# Patient Record
Sex: FEMALE | Race: WHITE | NOT HISPANIC OR LATINO | Employment: STUDENT | URBAN - METROPOLITAN AREA
[De-identification: names, ages, dates, MRNs, and addresses within clinical notes are randomized per-mention and may not be internally consistent; named-entity substitution may affect disease eponyms.]

---

## 2017-04-25 ENCOUNTER — ALLSCRIPTS OFFICE VISIT (OUTPATIENT)
Dept: OTHER | Facility: OTHER | Age: 22
End: 2017-04-25

## 2017-05-08 ENCOUNTER — ALLSCRIPTS OFFICE VISIT (OUTPATIENT)
Dept: OTHER | Facility: OTHER | Age: 22
End: 2017-05-08

## 2017-05-08 LAB
BILIRUB UR QL STRIP: NORMAL
CLARITY UR: NORMAL
COLOR UR: NORMAL
GLUCOSE (HISTORICAL): NORMAL
HGB UR QL STRIP.AUTO: NORMAL
KETONES UR STRIP-MCNC: NORMAL MG/DL
LEUKOCYTE ESTERASE UR QL STRIP: NORMAL
NITRITE UR QL STRIP: NORMAL
PH UR STRIP.AUTO: 6 [PH]
PROT UR STRIP-MCNC: NORMAL MG/DL
SP GR UR STRIP.AUTO: 1
UROBILINOGEN UR QL STRIP.AUTO: NORMAL

## 2017-07-18 ENCOUNTER — LAB REQUISITION (OUTPATIENT)
Dept: LAB | Facility: HOSPITAL | Age: 22
End: 2017-07-18
Payer: COMMERCIAL

## 2017-07-18 ENCOUNTER — ALLSCRIPTS OFFICE VISIT (OUTPATIENT)
Dept: OTHER | Facility: OTHER | Age: 22
End: 2017-07-18

## 2017-07-18 DIAGNOSIS — Z01.419 ENCOUNTER FOR GYNECOLOGICAL EXAMINATION WITHOUT ABNORMAL FINDING: ICD-10-CM

## 2017-07-18 DIAGNOSIS — R87.610 ATYPICAL SQUAMOUS CELLS OF UNDETERMINED SIGNIFICANCE ON CYTOLOGIC SMEAR OF CERVIX (ASC-US): ICD-10-CM

## 2017-07-18 PROCEDURE — 88175 CYTOPATH C/V AUTO FLUID REDO: CPT | Performed by: NURSE PRACTITIONER

## 2017-07-25 LAB
LAB AP GYN PRIMARY INTERPRETATION: NORMAL
LAB AP LMP: NORMAL
Lab: NORMAL

## 2017-08-23 ENCOUNTER — ALLSCRIPTS OFFICE VISIT (OUTPATIENT)
Dept: OTHER | Facility: OTHER | Age: 22
End: 2017-08-23

## 2017-08-23 ENCOUNTER — APPOINTMENT (OUTPATIENT)
Dept: LAB | Facility: HOSPITAL | Age: 22
End: 2017-08-23
Attending: INTERNAL MEDICINE
Payer: COMMERCIAL

## 2017-08-23 DIAGNOSIS — N39.0 URINARY TRACT INFECTION: ICD-10-CM

## 2017-08-23 LAB
BACTERIA UR QL AUTO: ABNORMAL /HPF
BILIRUB UR QL STRIP: NEGATIVE
BILIRUB UR QL STRIP: NEGATIVE
CLARITY UR: ABNORMAL
CLARITY UR: NORMAL
COLOR UR: YELLOW
COLOR UR: YELLOW
GLUCOSE (HISTORICAL): NEGATIVE
GLUCOSE UR STRIP-MCNC: NEGATIVE MG/DL
HGB UR QL STRIP.AUTO: NEGATIVE
HGB UR QL STRIP.AUTO: NORMAL
HYALINE CASTS #/AREA URNS LPF: ABNORMAL /LPF
KETONES UR STRIP-MCNC: NEGATIVE MG/DL
KETONES UR STRIP-MCNC: NEGATIVE MG/DL
LEUKOCYTE ESTERASE UR QL STRIP: ABNORMAL
LEUKOCYTE ESTERASE UR QL STRIP: NORMAL
NITRITE UR QL STRIP: NEGATIVE
NITRITE UR QL STRIP: NEGATIVE
NON-SQ EPI CELLS URNS QL MICRO: ABNORMAL /HPF
PH UR STRIP.AUTO: 5 [PH]
PH UR STRIP.AUTO: 6 [PH] (ref 4.5–8)
PROT UR STRIP-MCNC: NEGATIVE MG/DL
PROT UR STRIP-MCNC: NEGATIVE MG/DL
RBC #/AREA URNS AUTO: ABNORMAL /HPF
SP GR UR STRIP.AUTO: 1.01
SP GR UR STRIP.AUTO: 1.01 (ref 1–1.03)
UROBILINOGEN UR QL STRIP.AUTO: 0.2 E.U./DL
UROBILINOGEN UR QL STRIP.AUTO: NEGATIVE
WBC #/AREA URNS AUTO: ABNORMAL /HPF

## 2017-08-23 PROCEDURE — 81001 URINALYSIS AUTO W/SCOPE: CPT

## 2017-08-23 PROCEDURE — 87086 URINE CULTURE/COLONY COUNT: CPT

## 2017-08-24 LAB — BACTERIA UR CULT: NORMAL

## 2017-10-01 DIAGNOSIS — E28.2 POLYCYSTIC OVARIAN SYNDROME: ICD-10-CM

## 2017-10-01 DIAGNOSIS — R63.5 ABNORMAL WEIGHT GAIN: ICD-10-CM

## 2017-10-01 DIAGNOSIS — E66.9 OBESITY: ICD-10-CM

## 2017-10-01 DIAGNOSIS — E55.9 VITAMIN D DEFICIENCY: ICD-10-CM

## 2017-10-10 ENCOUNTER — GENERIC CONVERSION - ENCOUNTER (OUTPATIENT)
Dept: OTHER | Facility: OTHER | Age: 22
End: 2017-10-10

## 2017-10-10 ENCOUNTER — APPOINTMENT (OUTPATIENT)
Dept: LAB | Facility: MEDICAL CENTER | Age: 22
End: 2017-10-10
Payer: COMMERCIAL

## 2017-10-10 DIAGNOSIS — E66.9 OBESITY: ICD-10-CM

## 2017-10-10 DIAGNOSIS — E28.2 POLYCYSTIC OVARIAN SYNDROME: ICD-10-CM

## 2017-10-10 DIAGNOSIS — E55.9 VITAMIN D DEFICIENCY: ICD-10-CM

## 2017-10-10 DIAGNOSIS — R63.5 ABNORMAL WEIGHT GAIN: ICD-10-CM

## 2017-10-10 LAB
ALBUMIN SERPL BCP-MCNC: 3.4 G/DL (ref 3.5–5)
ALP SERPL-CCNC: 62 U/L (ref 46–116)
ALT SERPL W P-5'-P-CCNC: 17 U/L (ref 12–78)
ANION GAP SERPL CALCULATED.3IONS-SCNC: 11 MMOL/L (ref 4–13)
AST SERPL W P-5'-P-CCNC: 25 U/L (ref 5–45)
BILIRUB SERPL-MCNC: 0.27 MG/DL (ref 0.2–1)
BUN SERPL-MCNC: 15 MG/DL (ref 5–25)
CALCIUM SERPL-MCNC: 9 MG/DL (ref 8.3–10.1)
CHLORIDE SERPL-SCNC: 106 MMOL/L (ref 100–108)
CHOLEST SERPL-MCNC: 174 MG/DL (ref 50–200)
CO2 SERPL-SCNC: 19 MMOL/L (ref 21–32)
CREAT SERPL-MCNC: 0.7 MG/DL (ref 0.6–1.3)
GFR SERPL CREATININE-BSD FRML MDRD: 123 ML/MIN/1.73SQ M
GLUCOSE P FAST SERPL-MCNC: 83 MG/DL (ref 65–99)
HDLC SERPL-MCNC: 79 MG/DL (ref 40–60)
INSULIN SERPL-ACNC: 15.2 MU/L (ref 3–25)
LDLC SERPL CALC-MCNC: 58 MG/DL (ref 0–100)
POTASSIUM SERPL-SCNC: 4.8 MMOL/L (ref 3.5–5.3)
PROT SERPL-MCNC: 8 G/DL (ref 6.4–8.2)
SODIUM SERPL-SCNC: 136 MMOL/L (ref 136–145)
T4 FREE SERPL-MCNC: 1.11 NG/DL (ref 0.76–1.46)
TRIGL SERPL-MCNC: 183 MG/DL
TSH SERPL DL<=0.05 MIU/L-ACNC: 3.28 UIU/ML (ref 0.36–3.74)

## 2017-10-10 PROCEDURE — 36415 COLL VENOUS BLD VENIPUNCTURE: CPT

## 2017-10-10 PROCEDURE — 84443 ASSAY THYROID STIM HORMONE: CPT

## 2017-10-10 PROCEDURE — 80061 LIPID PANEL: CPT

## 2017-10-10 PROCEDURE — 84402 ASSAY OF FREE TESTOSTERONE: CPT

## 2017-10-10 PROCEDURE — 84403 ASSAY OF TOTAL TESTOSTERONE: CPT

## 2017-10-10 PROCEDURE — 83525 ASSAY OF INSULIN: CPT

## 2017-10-10 PROCEDURE — 80053 COMPREHEN METABOLIC PANEL: CPT

## 2017-10-10 PROCEDURE — 84439 ASSAY OF FREE THYROXINE: CPT

## 2017-10-11 LAB
TESTOST FREE SERPL-MCNC: 2.6 PG/ML (ref 0–4.2)
TESTOST SERPL-MCNC: 58 NG/DL (ref 8–48)

## 2017-10-12 ENCOUNTER — GENERIC CONVERSION - ENCOUNTER (OUTPATIENT)
Dept: OTHER | Facility: OTHER | Age: 22
End: 2017-10-12

## 2017-10-30 ENCOUNTER — ALLSCRIPTS OFFICE VISIT (OUTPATIENT)
Dept: OTHER | Facility: OTHER | Age: 22
End: 2017-10-30

## 2017-10-31 NOTE — PROGRESS NOTES
Assessment  1  PCOS (polycystic ovarian syndrome) (256 4) (E28 2)   2  Vitamin D deficiency (268 9) (E55 9)   3  Hirsutism (704 1) (L68 0)   4  Enlarged thyroid (240 9) (E04 9)    Plan  Enlarged thyroid    · (1) T4, FREE; Status:Active; Requested for:30Nov2017;    Perform:[facility]; Due:30Nov2018; Ordered;For:Enlarged thyroid; Ordered By:Hong Eric;   · (1) THYROID ANTIBODIES PANEL; Status:Active; Requested for:30Nov2017;    Perform:[facility]; Due:30Nov2018; Ordered;For:Enlarged thyroid; Ordered By:Hong Eric;   · (1) TSH; Status:Active; Requested for:30Nov2017;    Perform:[facility]; Due:30Nov2018; Ordered;For:Enlarged thyroid; Ordered By:Hong Eric;  Enlarged thyroid, PCOS (polycystic ovarian syndrome), Vitamin D deficiency    · Follow-up visit in 6 months Evaluation and Treatment  Follow-up  Status: Complete   Done: 38CHM0881   Ordered; For: Enlarged thyroid, PCOS (polycystic ovarian syndrome), Vitamin D deficiency; Ordered By: Julieth Gupta Performed:  Due: 61AOR1233; Last Updated By: Chinedu Mccrary; 10/30/2017 2:02:31 PM  Hirsutism    · Spironolactone 50 MG Oral Tablet; 1 tab twice daily   Rx By: Julieth Gupta; Dispense: 90 Days ; #:180 Tablet; Refill: 1;For: Hirsutism; DULCE MARIA = N; Verified Transmission to [address] ; Last Updated By: System, [employer]; 10/30/2017 2:01:31 PM  PCOS (polycystic ovarian syndrome)    · MetFORMIN HCl  MG Oral Tablet Extended Release 24 Hour; 2 tabs daily  with food   Rx By: Julieth Gupta; Dispense: 90 Days ; #:180 Tablet Extended Release 24 Hour; Refill: 3;For: PCOS (polycystic ovarian syndrome); DULCE MARIA = N; Verified Transmission to [address] ; Last Updated By: System, [employer]; 10/30/2017 2:01:33 PM   · (1) BASIC METABOLIC PROFILE; Status:Active; Requested for:30Nov2017;    Perform:[facility]; Due:30Nov2018; Ordered; For:PCOS (polycystic ovarian syndrome);  Ordered By:Jeaneth Becca Gongora;  Vitamin D deficiency    · (1) VITAMIN D 25-HYDROXY; Status:Active; Requested for:30Nov2017;    Perform:[facility]; Due:30Nov2018; Ordered; For:Vitamin D deficiency; Ordered By:Becca Eric;    Discussion/Summary  1- PCOS/hirsutism -continue oral contraceptive pills  Increase spironolactone to 1 tablet twice daily  Repeat electrolytes in about a month  Also continue metformin at current dose  Focus on dietary and lifestyle modifications and weight loss  2  Vitamin-D deficiency - continue supplementations  3  Enlarged thyroid - TSH on the higher end of normal, repeat TSH, free T4 and also check TPO and thyroglobulin antibodies  If TSH above 3 consider starting levothyroxine   Counseling Documentation With Imm: The patient was counseled regarding diagnostic results,-- instructions for management,-- risk factor reductions,-- impressions,-- risks and benefits of treatment options,-- importance of compliance with treatment  Patient's Capacity to Self-Care: Patient is able to Self-Care  Medication SE Review and Pt Understands Tx: The treatment plan was reviewed with the patient/guardian  The patient/guardian understands and agrees with the treatment plan      Chief Complaint  Chief Complaint Free Text Note Form: Follow Up      History of Present Illness  HPI:   y/o woman referred here To follow up on PCOS and hirsutism - mostly facial/chin , she has noticed significant improvement since starting spironolactone  try to be physically active - has gained a few lb since her last visitany acne or hair loss   for vitamin-D deficiency she is on supplementations,      Review of Systems  ROS Reviewed:   ROS reviewed  Endo Adult ROS Female Established v2 Update - [facility]:   Constitutional/General: recent weight gain  Mouth / Throat: difficulty swallowing  Neck: neck pain,-- neck stiffness-- and-- enlarged thyroid  Skin & Hair: dry skin,-- hair loss-- and-- excessive hair growth  Active Problems  1  Abnormal weight gain (783 1) (R63 5)   2  Acute UTI (599 0) (N39 0)   3  Atypical squamous cells of undetermined significance on cytologic smear of cervix   (ASC-US) (795 01) (R87 610)   4  Cervical cancer screening (V76 2) (Z12 4)   5  Encounter for gynecological examination without abnormal finding (V72 31) (Z01 419)   6  Encounter for surveillance of contraceptive pills (V25 41) (Z30 41)   7  Enlarged thyroid (240 9) (E04 9)   8  Hirsutism (704 1) (L68 0)   9  Need for meningococcal vaccination (V03 89) (Z23)   10  Obesity (278 00) (E66 9)   11  PCOS (polycystic ovarian syndrome) (256 4) (E28 2)   12  Vitamin D deficiency (268 9) (E55 9)    Past Medical History  1  Denied: History of depression   2  History of fatigue (V13 89) (Z87 898)   3  History of muscle pain (V13 59) (Z87 39)   4  Denied: History of substance abuse   5  History of Irregular periods/menstrual cycles (626 4) (N92 6)   6  History of Suspected UTI (599 0) (N39 0)  Active Problems And Past Medical History Reviewed: The active problems and past medical history were reviewed and updated today  Surgical History  1  Denied: History Of Prior Surgery  Surgical History Reviewed: The surgical history was reviewed and updated today  Family History  Mother    1  Denied: Family history of substance abuse  Father    2  Family history of arthritis (V17 7) (Z82 61)   3  Family history of depression (V17 0) (Z81 8)   4  Denied: Family history of substance abuse  Grandmother    5  Family history of malignant neoplasm (V16 9) (Z80 9)  Grandfather    6  Family history of alcohol abuse (V61 41) (Z81 1)  Family History Reviewed: The family history was reviewed and updated today  Social History   · Denied: History of Caffeine use   · Never a smoker   · Occasional alcohol use  Social History Reviewed: The social history was reviewed and updated today  Current Meds   1   MetFORMIN HCl  MG Oral Tablet Extended Release 24 Hour; 2 tabs daily with   food; Therapy: 97Jaw4941 to [phone]  Requested for: 99Qdg4429; Last   Rx:17Apq6795 Ordered   2  Spironolactone 50 MG Oral Tablet; 1 Tab daily; Therapy: 19GTM9273 to [phone]  Requested for: 70Qjh2280; Last   Rx:08Ntl5279 Ordered   3  Tri-Sprintec 0 18/0 215/0 25 MG-35 MCG Oral Tablet; TAKE 1 TABLET DAILY AS   DIRECTED  Requested for: 50TUM2363; Last Rx:17Bwb0188 Ordered   4  Vitamin D3 2000 UNIT Oral Capsule; 1 Tab daily; Therapy: 32NCU6729 to (Evaluate:63Bmc1609); Last Rx:24Syy0827 Ordered  Medication List Reviewed: The medication list was reviewed and updated today  Allergies  1  No Known Drug Allergies    Vitals  Vital Signs    Recorded: 65KPH2074 01:46PM   Heart Rate 68   Systolic 975   Diastolic 70   Height 5 ft 2 in   Weight 180 lb 4 00 oz   BMI Calculated 32 97   BSA Calculated 1 83     Physical Exam    Constitutional   General appearance: No acute distress, well appearing and well nourished  Eyes   Conjunctiva and lids: No swelling, erythema, or discharge  Pupils: Equal, round and reactive to light  The sclera are anicteric  Extraocular movements are intact  Ears, Nose, Mouth, and Throat   External inspection of ears, nose and lips: Normal     Oropharynx: Normal with no erythema, edema, exudate or lesions  Exam of Head: The head is atraumatic and normocephalic  Neck: Abnormal  -- mild thyromegaly  Pulmonary   Auscultation of lungs: Clear to auscultation bilaterally with normal chest expansion  Cardiovascular   Auscultation of heart: Normal rate and rhythm with no murmurs, gallops or rubs  Examination of extremities for edema and/or varicosities: Normal     Abdomen   Abdomen: Abdomen is soft, non-tender with normal bowel sounds  Lymphatic   Palpation of lymph nodes: No supraclavicular or suboccipital lymphadenopathy      Musculoskeletal   Gait and station: Normal     Inspection/palpation of joints, bones, and muscles: Muscle bulk and tone is normal     Skin   Skin and subcutaneous tissue: Normal skin temperature and color  Neurologic   Motor Strength: Strength is 5/5 bilaterally  Psychiatric   Orientation to person, place and time: Normal     Mood and affect: Affect and attention span are normal        Results/Data  (1) TSH 10Oct2017 07:39AM [employer] Order Number: [id number]     Test Name Result Flag Reference   TSH 3 280 uIU/mL  0 358-3 740   Patients undergoing fluorescein dye angiography may retain small amounts of fluorescein in the body for 48-72 hours post procedure  Samples containing fluorescein can produce falsely depressed TSH values  If the patient had this procedure,a specimen should be resubmitted post fluorescein clearance  The recommended reference ranges for TSH during pregnancy are as follows:  First trimester 0 1 to 2 5 uIU/mL  Second trimester  0 2 to 3 0 uIU/mL  Third trimester 0 3 to 3 0 uIU/m     (1) T4, FREE 10Oct2017 07:39AM [employer] Order Number: [id number]     Test Name Result Flag Reference   T4,FREE 1 11 ng/dL  0 76-1 46   Specimen collection should occur prior to Sulfasalazine administration due to the potential for falsely elevated results  (1) COMPREHENSIVE METABOLIC PANEL 78KKP3914 70:41ON [employer] Order Number: [id number]     Test Name Result Flag Reference   SODIUM 136 mmol/L  136-145   POTASSIUM 4 8 mmol/L  3 5-5 3   Slightly Hemolyzed;  Results May be Affected   CHLORIDE 106 mmol/L  100-108   CARBON DIOXIDE 19 mmol/L L 21-32   ANION GAP (CALC) 11 mmol/L  4-13   BLOOD UREA NITROGEN 15 mg/dL  5-25   CREATININE 0 70 mg/dL  0 60-1 30   Standardized to IDMS reference method   CALCIUM 9 0 mg/dL  8 3-10 1   BILI, TOTAL 0 27 mg/dL  0 20-1 00   ALK PHOSPHATAS 62 U/L     ALT (SGPT) 17 U/L  12-78   Specimen collection should occur prior to Sulfasalazine and/or Sulfapyridine administration due to the potential for falsely depressed results  AST(SGOT) 25 U/L  5-45   Slightly Hemolyzed; Results May be Affected  Specimen collection should occur prior to Sulfasalazine administration due to the potential for falsely depressed results  ALBUMIN 3 4 g/dL L 3 5-5 0   TOTAL PROTEIN 8 0 g/dL  6 4-8 2   eGFR 123 ml/min/1 73sq m     [facility] Disease Education Program recommendations are as follows:  GFR calculation is accurate only with a steady state creatinine  Chronic Kidney disease less than 60 ml/min/1 73 sq  meters  Kidney failure less than 15 ml/min/1 73 sq  meters  GLUCOSE FASTING 83 mg/dL  65-99   Specimen collection should occur prior to Sulfasalazine administration due to the potential for falsely depressed results  Specimen collection should occur prior to Sulfapyridine administration due to the potential for falsely elevated results  (1) LIPID PANEL, FASTING 69EFV9672 07:39AM Zaida Paez Order Number: [id number]     Test Name Result Flag Reference   CHOLESTEROL 174 mg/dL     HDL,DIRECT 79 mg/dL H 40-60   Specimen collection should occur prior to Metamizole administration due to the potential for falsley depressed results  LDL CHOLESTEROL CALCULATED 58 mg/dL  0-100   Triglyceride:        Normal <150 mg/dl   Borderline High 150-199 mg/dl   High 200-499 mg/dl   Very High >499 mg/dl      Cholesterol:       Desirable <200 mg/dl    Borderline High 200-239 mg/dl    High >239 mg/dl      HDL Cholesterol:       High>59 mg/dL    Low <41 mg/dL      This screening LDL is a calculated result  It does not have the accuracy of the Direct Measured LDL in the monitoring of patients with hyperlipidemia and/or statin therapy  Direct Measure LDL (FHX576) must be ordered separately in these patients  TRIGLYCERIDES 183 mg/dL H <=150   Specimen collection should occur prior to N-Acetylcysteine or Metamizole administration due to the potential for falsely depressed results       (1) TESTOSTERONE, FREE (DIRECT) AND TOTAL 10Oct2017 07:39AM Jo Rey Order Number: [id number]     Test Name Result Flag Reference   FREE TESTOSTERONE, DIRECT 2 6 pg/mL  0 0 - 4 2   TESTOSTERONE (TOTAL) 58 ng/dL H 8 - 48   Performed at:  [facility] [address]  [id number]  : Wiley Baugh MD, Phone:  [phone]     (1) INSULIN 01ICD8531 07:39AM Jo Rey Order Number: [id number]     Test Name Result Flag Reference   INSULIN 15 2 mU/L  3 0-25 0     Future Appointments    Date/Time Provider Specialty Site   04/30/2018 02:00 PM Carson Wilkinson [facility] Endocrinology [facility] ENDOCRINOLOGY   07/25/2018 09:40 AM Narciso Kayser, CRNP Obstetrics/Gynecology [facility]     Signatures   Electronically signed by : ANAMARIA Jimenez ; Oct 30 2017  2:21PM EST                       (Author)

## 2017-11-30 DIAGNOSIS — E28.2 POLYCYSTIC OVARIAN SYNDROME: ICD-10-CM

## 2017-11-30 DIAGNOSIS — E55.9 VITAMIN D DEFICIENCY: ICD-10-CM

## 2017-11-30 DIAGNOSIS — E04.9 NONTOXIC GOITER: ICD-10-CM

## 2018-01-10 NOTE — RESULT NOTES
Message   testosterone unchanged - however as hirsutism is improving continue spironolactone     Verified Results  (1) TESTOSTERONE, FREE (DIRECT) AND TOTAL [id number] 10:16AM Lucille Mcdonald Order Number: [id number]     Test Name Result Flag Reference   FREE TESTOSTERONE, DIRECT 2 8 pg/mL  0 0 - 4 2   TESTOSTERONE (TOTAL) 71 ng/dL H 8 - 48   Performed at:  [facility] [address]  [id number]  : Melany Whitaker MD, Phone:  [phone]

## 2018-01-10 NOTE — PROGRESS NOTES
Assessment    1  Hirsutism (704 1) (L68 0)   2  Fatigue (780 79) (R53 83)   3  Obesity (278 00) (E66 9)   4  Enlarged thyroid (240 9) (E04 9)    Plan  Enlarged thyroid    · US THYROID; Status:Hold For - Scheduling; Requested for:14Apr2016;   Fatigue    · (1) CBC/PLT/DIFF; Status:Active; Requested for:14Apr2016;    · (1) VITAMIN D 25-HYDROXY; Status:Active; Requested for:14Apr2016;   Fatigue, Obesity    · (1) COMPREHENSIVE METABOLIC PANEL; Status:Active; Requested for:14Apr2016;    · (1) T4, FREE; Status:Active; Requested for:14Apr2016;    · (1) TSH; Status:Active; Requested for:14Apr2016; Health Maintenance    · (1) LIPID PANEL, FASTING; Status:Active; Requested for:14Apr2016;     Discussion/Summary  health maintenance visit Currently, she has an adequate exercise regimen  the risks and benefits of cervical cancer screening were discussed Breast cancer screening: the risks and benefits of breast cancer screening were discussed and breast cancer screening is not indicated  Colorectal cancer screening: the risks and benefits of colorectal cancer screening were discussed and colorectal cancer screening is not indicated  Will check lab  ? if her thyroid is enlarged - will check u/s  told her eventual endo consult for her hirstuism  Possible side effects of new medications were reviewed with the patient/guardian today  Chief Complaint  patient presented here for physical      History of Present Illness  HM, Adult Female: The patient is being seen for a health maintenance evaluation  General Health: The patient's health since the last visit is described as good  She has regular dental visits  She denies vision problems  She denies hearing loss  Lifestyle:  She consumes a diverse and healthy diet  She has weight concerns  She exercises regularly  She does not use tobacco  She consumes alcohol  She denies drug use     Reproductive health: the patient is premenopausal    Screening: Breast cancer screening includes no previous mammogram  She hasn't been previously screened for colorectal cancer  Cardiovascular risk factors: obesity, but no hypertension, no diabetes, no tobacco use and no illicit drug use  General health risks: no asbestos exposure and no radiation exposure  Safety elements used: safe driving habits  Risk findings: no guns at home  Review of Systems    Constitutional: No fever, no chills, feels well, no tiredness, no recent weight gain or weight loss  Eyes: No complaints of eye pain, no red eyes, no eyesight problems, no discharge, no dry eyes, no itching of eyes  Cardiovascular: No complaints of slow heart rate, no fast heart rate, no chest pain, no palpitations, no leg claudication, no lower extremity edema  Surgical History    · Denied: History Of Prior Surgery    Family History    · No pertinent family history    · Family history of arthritis (V17 7) (Z82 61)   · Family history of depression (V17 0) (Z81 8)    · Family history of malignant neoplasm (V16 9) (Z80 9)    · Family history of alcohol abuse (V61 41) (Z81 1)    Social History    · Never a smoker   · Occasional alcohol use    Current Meds   1  No Reported Medications Recorded    Allergies    1  No Known Drug Allergies    Vitals   Recorded: 14Apr2016 03:38PM   Temperature 98 5 F, Tympanic   Heart Rate 70   Pulse Quality Normal   Respiration 16   Respiration Quality Normal   Systolic 427, LUE, Sitting   Diastolic 64, LUE, Sitting   Height 5 ft 2 in   Weight 175 lb 4 oz   BMI Calculated 32 05   BSA Calculated 1 81   O2 Saturation 98     Physical Exam    Constitutional   General appearance: No acute distress, well appearing and well nourished  Head and Face   Head and face: Normal     Eyes   Conjunctiva and lids: No swelling, erythema or discharge  Pupils and irises: Equal, round, reactive to light      Ears, Nose, Mouth, and Throat   External inspection of ears and nose: Normal     Otoscopic examination: Tympanic membranes translucent with normal light reflex  Canals patent without erythema  Hearing: Normal     Nasal mucosa, septum, and turbinates: Normal without edema or erythema  Lips, teeth, and gums: Normal, good dentition  Oropharynx: Normal with no erythema, edema, exudate or lesions  Neck   Neck: Supple, symmetric, trachea midline, no masses  Thyroid: Normal, no thyromegaly  - bruit  Pulmonary   Respiratory effort: No increased work of breathing or signs of respiratory distress  Palpation of chest: Normal     Auscultation of lungs: Clear to auscultation  Cardiovascular   Auscultation of heart: Normal rate and rhythm, normal S1 and S2, no murmurs  Results/Data  Urine Dip Non-Automated- POC 23Cyb9336 03:40PM Fili Camacho     Test Name Result Flag Reference   Color Yellow     Leukocytes neg     Nitrite neg     Blood neg     Bilirubin neg     Urobilinogen neg     Protein neg     Ph 8     Specific [place] 1 005     Ketone neg     Glucose neg       PHQ-2 Adult Depression Screening 52Cql9904 03:39PM User, Dons     Test Name Result Flag Reference   PHQ-2 Adult Depression Score 0     Q1: 0, Q2: 0   PHQ-2 Adult Depression Screening Negative         Procedure    Procedure: Audiometry: Normal bilaterally  Hearing in the right ear: 20 decibals at 500 hertz, 20 decibals at 1000 hertz, 20 decibals at 2000 hertz and 20 decibals at 4000 hertz  Hearing in the left ear: 20 decibals at 500 hertz, 20 decibals at 1000 hertz, 20 decibals at 2000 hertz and 20 decibals at 4000 hertz  Procedure:   Results: 20/20 in both eyes without corrective device, 20/20 in the right eye without corrective device, 20/20 in the left eye without corrective device normal in both eyes  Signatures   Electronically signed by : Mellissa Rankin DO;  Apr 14 2016  4:06PM EST                       (Author)

## 2018-01-12 VITALS
HEIGHT: 62 IN | SYSTOLIC BLOOD PRESSURE: 124 MMHG | OXYGEN SATURATION: 97 % | RESPIRATION RATE: 16 BRPM | BODY MASS INDEX: 32.62 KG/M2 | HEART RATE: 71 BPM | DIASTOLIC BLOOD PRESSURE: 80 MMHG | WEIGHT: 177.25 LBS | TEMPERATURE: 97.7 F

## 2018-01-12 VITALS
DIASTOLIC BLOOD PRESSURE: 70 MMHG | WEIGHT: 180.25 LBS | BODY MASS INDEX: 33.17 KG/M2 | HEIGHT: 62 IN | HEART RATE: 68 BPM | SYSTOLIC BLOOD PRESSURE: 122 MMHG

## 2018-01-13 VITALS
TEMPERATURE: 97.1 F | OXYGEN SATURATION: 98 % | SYSTOLIC BLOOD PRESSURE: 124 MMHG | HEIGHT: 62 IN | HEART RATE: 70 BPM | RESPIRATION RATE: 16 BRPM | DIASTOLIC BLOOD PRESSURE: 78 MMHG | WEIGHT: 174.25 LBS | BODY MASS INDEX: 32.07 KG/M2

## 2018-01-13 VITALS
HEIGHT: 62 IN | SYSTOLIC BLOOD PRESSURE: 112 MMHG | BODY MASS INDEX: 32.84 KG/M2 | DIASTOLIC BLOOD PRESSURE: 72 MMHG | WEIGHT: 178.44 LBS | HEART RATE: 72 BPM

## 2018-01-13 NOTE — RESULT NOTES
Discussion/Summary   appt 10/30     Verified Results  (1) TESTOSTERONE, FREE (DIRECT) AND TOTAL 10Oct2017 07:39AM Salvador Barnett Order Number: [id number]     Test Name Result Flag Reference   FREE TESTOSTERONE, DIRECT 2 6 pg/mL  0 0 - 4 2   TESTOSTERONE (TOTAL) 58 ng/dL H 8 - 48   Performed at:  [address] [place] [address]  [id number]  : Katarina Almanzar MD, Phone:  [phone]

## 2018-01-13 NOTE — MISCELLANEOUS
Message  left message her lab is good  need to take vit d3 - 2,000 units indefinitely  await her thyroid u/s then consult endo      Signatures   Electronically signed by : Hoa Hutchins DO;  Apr 20 2016  7:55AM EST                       (Author)

## 2018-01-13 NOTE — RESULT NOTES
Message   where is written report ?      Verified Results  (1) 23-J-WU-PROGESTERONE 69PAK4023 07:12AM Brittnee Matt Order Number: [id number]     Test Name Result Flag Reference   31-Y(FP)IENNBTC      181  Follicular    15 - 70  Luteal      35 - 290 ng/dL   Performed at:  [address]  [id number]  : J Luis Finley MD, Phone:  [phone]     (1) SPECIAL TEST 91VGL8211 07:12AM Budd Rash     Test Name Result Flag Reference   TEST RESULT      SEE WRITTEN REPORT FROM [place]

## 2018-01-14 VITALS
WEIGHT: 175 LBS | HEIGHT: 62 IN | SYSTOLIC BLOOD PRESSURE: 110 MMHG | BODY MASS INDEX: 32.2 KG/M2 | DIASTOLIC BLOOD PRESSURE: 68 MMHG

## 2018-01-15 NOTE — MISCELLANEOUS
Message   Recorded as Task   Date: 09/07/2016 04:34 PM, Created By: Anika Ernst   Task Name: Call Back   Assigned To: Alejo Noriega   Regarding Patient: Meri Perez, Status: Active   CommentRenato Pinto - 07 Sep 2016 4:34 PM     TASK CREATED  Caller: Self; [phone] (Home)  pt lmom - she went to her pharmacy and they did not have her BC  She is asking for it to be resent to [facility]  please advise [phone]   Gurwinder Charles - 08 Sep 2016 7:49 AM     TASK REASSIGNED: Previously Assigned To Yuniortammy Dave - 08 Sep 2016 8:05 AM     TASK EDITED                 resent was entered as recorded not sent to retail        Active Problems    1  Abnormal weight gain (783 1) (R63 5)   2  Encounter for gynecological examination without abnormal finding (V72 31) (Z01 419)   3  Encounter for surveillance of contraceptive pills (V25 41) (Z30 41)   4  Enlarged thyroid (240 9) (E04 9)   5  Fatigue (780 79) (R53 83)   6  Hirsutism (704 1) (L68 0)   7  Muscle ache (729 1) (M79 1)   8  Obesity (278 00) (E66 9)   9  PCOS (polycystic ovarian syndrome) (256 4) (E28 2)   10  Vitamin D deficiency (268 9) (E55 9)    Current Meds   1  Spironolactone 50 MG Oral Tablet; 1 Tab daily; Therapy: 94OKE8301 to (Evaluate:07Jan2017)  Requested for: 12XQV3334; Last   Rx:96Lvj0671 Ordered   2  Tri-Sprintec 0 18/0 215/0 25 MG-35 MCG Oral Tablet; TAKE 1 TABLET DAILY AS   DIRECTED; Last Rx:70Pzk3464 Ordered   3  Vitamin D3 2000 UNIT Oral Capsule; 1 Tab daily; Therapy: 64TRZ1354 to (Evaluate:84Ubw3549); Last Rx:02Jun2016 Ordered    Allergies    1   No Known Drug Allergies    Plan  Encounter for surveillance of contraceptive pills    · Tri-Sprintec 0 18/0 215/0 25 MG-35 MCG Oral Tablet; TAKE 1 TABLET DAILY AS  DIRECTED    Signatures   Electronically signed by : Dio Monet, ; Sep  8 2016  8:05AM EST                       (Author)

## 2018-01-15 NOTE — RESULT NOTES
Message   labs ok, continue current meds      Verified Results  (1) Kathryn 94RSQ8873 10:16AM [facility] Order Number: [id number]     Test Name Result Flag Reference   GLUCOSE,RANDM 78 mg/dL     If the patient is fasting, the ADA then defines impaired fasting glucose as > 100 mg/dL and diabetes as > or equal to 123 mg/dL  SODIUM 140 mmol/L  136-145   POTASSIUM 4 1 mmol/L  3 5-5 3   CHLORIDE 107 mmol/L  100-108   CARBON DIOXIDE 26 mmol/L  21-32   ANION GAP (CALC) 7 mmol/L  4-13   BLOOD UREA NITROGEN 7 mg/dL  5-25   CREATININE 0 65 mg/dL  0 60-1 30   Standardized to IDMS reference method   CALCIUM 9 2 mg/dL  8 3-10 1   eGFR Non-African American      >60 0 ml/min/1 73sq [facility] Disease Education Program recommendations are as follows:  GFR calculation is accurate only with a steady state creatinine  Chronic Kidney disease less than 60 ml/min/1 73 sq  meters  Kidney failure less than 15 ml/min/1 73 sq  meters  (1) VITAMIN D 25-HYDROXY 29LMX1486 10:16AM Sandy Brian    Order Number: [id number]     Test Name Result Flag Reference   VIT D 25-HYDROX 39 5 ng/mL  30 0-100 0   This assay is a certified procedure of the CDC Vitamin D Standardization Certification Program (VDSCP)     Deficiency <20ng/ml   Insufficiency 20-30ng/ml   Sufficient  ng/ml     *Patients undergoing fluorescein dye angiography may retain small amounts of fluorescein in the body for 48-72 hours post procedure  Samples containing fluorescein can produce falsely elevated Vitamin D values  If the patient had this procedure, a specimen should be resubmitted post fluorescein clearance

## 2018-01-15 NOTE — RESULT NOTES
Message   has f/u next month     Verified Results  (1) 86-O-IO-PROGESTERONE [id number] 07:12AM Flora Higginbotham Order Number: [id number]     Test Name Result Flag Reference   17-A(OH)PROGEST      Adult Female  Follicular    15 - 70  Luteal      35 - 290 ng/dL   Performed at:  [address]  [id number]  : Claudia Dupont MD, Phone:  [phone]     (1) TESTOSTERONE, FREE (DIRECT) AND TOTAL [id number] 07:12AM Flora Higginbotham Order Number: [id number]     Test Name Result Flag Reference   FREE TESTOSTERONE, DIRECT 2 5 pg/mL  0 0 - 4 2   TESTOSTERONE (TOTAL) 72 ng/dL H 8 - 48   Performed at:  [facility] [address]  [id number]  : Falguni Brownlee MD, Phone:  [phone]

## 2018-01-16 NOTE — RESULT NOTES
Discussion/Summary   appt 10/30     Verified Results  (1) TSH 10Oct2017 07:39AM Kelli Alexander Order Number: [id number]     Test Name Result Flag Reference   TSH 3 280 uIU/mL  0 358-3 740   Patients undergoing fluorescein dye angiography may retain small amounts of fluorescein in the body for 48-72 hours post procedure  Samples containing fluorescein can produce falsely depressed TSH values  If the patient had this procedure,a specimen should be resubmitted post fluorescein clearance  The recommended reference ranges for TSH during pregnancy are as follows:  First trimester 0 1 to 2 5 uIU/mL  Second trimester  0 2 to 3 0 uIU/mL  Third trimester 0 3 to 3 0 uIU/m     (1) T4, FREE 10Oct2017 07:39AM Kelli Alexander Order Number: [id number]     Test Name Result Flag Reference   T4,FREE 1 11 ng/dL  0 76-1 46   Specimen collection should occur prior to Sulfasalazine administration due to the potential for falsely elevated results  (1) COMPREHENSIVE METABOLIC PANEL [id number] 06:01PA Kelli Serwayne Order Number: [id number]     Test Name Result Flag Reference   SODIUM 136 mmol/L  136-145   POTASSIUM 4 8 mmol/L  3 5-5 3   Slightly Hemolyzed; Results May be Affected   CHLORIDE 106 mmol/L  100-108   CARBON DIOXIDE 19 mmol/L L 21-32   ANION GAP (CALC) 11 mmol/L  4-13   BLOOD UREA NITROGEN 15 mg/dL  5-25   CREATININE 0 70 mg/dL  0 60-1 30   Standardized to IDMS reference method   CALCIUM 9 0 mg/dL  8 3-10 1   BILI, TOTAL 0 27 mg/dL  0 20-1 00   ALK PHOSPHATAS 62 U/L     ALT (SGPT) 17 U/L  12-78   Specimen collection should occur prior to Sulfasalazine and/or Sulfapyridine administration due to the potential for falsely depressed results  AST(SGOT) 25 U/L  5-45   Slightly Hemolyzed; Results May be Affected  Specimen collection should occur prior to Sulfasalazine administration due to the potential for falsely depressed results     ALBUMIN 3 4 g/dL L 3 5-5 0   TOTAL PROTEIN 8 0 g/dL  6 4-8 2   eGFR 123 ml/min/1 73sq m     National Kidney Disease Education Program recommendations are as follows:  GFR calculation is accurate only with a steady state creatinine  Chronic Kidney disease less than 60 ml/min/1 73 sq  meters  Kidney failure less than 15 ml/min/1 73 sq  meters  GLUCOSE FASTING 83 mg/dL  65-99   Specimen collection should occur prior to Sulfasalazine administration due to the potential for falsely depressed results  Specimen collection should occur prior to Sulfapyridine administration due to the potential for falsely elevated results  (1) LIPID PANEL, FASTING [id number] 07:39AM [employer] Order Number: [id number]     Test Name Result Flag Reference   CHOLESTEROL 174 mg/dL     HDL,DIRECT 79 mg/dL H 40-60   Specimen collection should occur prior to Metamizole administration due to the potential for falsley depressed results  LDL CHOLESTEROL CALCULATED 58 mg/dL  0-100   Triglyceride:        Normal <150 mg/dl   Borderline High 150-199 mg/dl   High 200-499 mg/dl   Very High >499 mg/dl      Cholesterol:       Desirable <200 mg/dl    Borderline High 200-239 mg/dl    High >239 mg/dl      HDL Cholesterol:       High>59 mg/dL    Low <41 mg/dL      This screening LDL is a calculated result  It does not have the accuracy of the Direct Measured LDL in the monitoring of patients with hyperlipidemia and/or statin therapy  Direct Measure LDL (ANM170) must be ordered separately in these patients  TRIGLYCERIDES 183 mg/dL H <=150   Specimen collection should occur prior to N-Acetylcysteine or Metamizole administration due to the potential for falsely depressed results       (1) INSULIN [id number] 07:39AM [employer] Order Number: [id number]     Test Name Result Flag Reference   INSULIN 15 2 mU/L  3 0-25 0

## 2018-01-16 NOTE — RESULT NOTES
Message   All labs normal     Verified Results  (1) HEMOGLOBIN A1C 29Jul2016 07:26AM Santhosh Schafer Order Number: [id number]     Test Name Result Flag Reference   HEMOGLOBIN A1C 5 2 %  4 2-6 3   EST  AVG  GLUCOSE 103 mg/dl       (1) INSULIN 29Jul2016 07:26AM Santhosh Schafer Order Number: [id number]     Test Name Result Flag Reference   INSULIN 23 6 mU/L  3 0-25 0     (1) BASIC METABOLIC PROFILE [id number] 07:26AM Santhosh Schafer Order Number: [id number]     Test Name Result Flag Reference   GLUCOSE,RANDM 78 mg/dL     If the patient is fasting, the ADA then defines impaired fasting glucose as > 100 mg/dL and diabetes as > or equal to 123 mg/dL  SODIUM 138 mmol/L  136-145   POTASSIUM 4 1 mmol/L  3 5-5 3   CHLORIDE 105 mmol/L  100-108   CARBON DIOXIDE 22 mmol/L  21-32   ANION GAP (CALC) 11 mmol/L  4-13   BLOOD UREA NITROGEN 11 mg/dL  5-25   CREATININE 0 64 mg/dL  0 60-1 30   Standardized to IDMS reference method   CALCIUM 9 7 mg/dL  8 3-10 1   eGFR Non-African American      >60 0 ml/min/1 73sq [facility] [employer] Disease Education Program recommendations are as follows:  GFR calculation is accurate only with a steady state creatinine  Chronic Kidney disease less than 60 ml/min/1 73 sq  meters  Kidney failure less than 15 ml/min/1 73 sq  meters

## 2018-01-16 NOTE — MISCELLANEOUS
Message   Recorded as Task   Date: 08/19/2016 08:09 AM, Created By: Mars Glass   Task Name: Result Follow Up   Assigned To: Arlette Liao   Regarding Patient: Mary Leon, Status: In Progress   CommentCherfidencio Hew - 19 Aug 2016 8:09 AM     TASK CREATED  Pap with ASCUS and pos HR HPV (neg 16/18)  Please notify patient and advise that ASCCP guidelines state that pap should be repeated in 12 mos and this will be performed at her next annual gyn visit  Please cofirm that she has had Gardasil x3 (I believe she has) and if not please advise completing the series  Also encourage healthy lifestyle and nonsmoking status to promote immunologic factors to help clear the virus  THanks! Magda Martínez - 19 Aug 2016 8:20 AM     TASK IN PROGRESS   Magda Martínez - 19 Aug 2016 8:27 AM     TASK EDITED  lm for pt tcb for pap results   Magda Martínez - 19 Aug 2016 9:39 AM     TASK EDITED     pt informed of robert's instructions    has completed gardasil course    Active Problems    1  Abnormal weight gain (783 1) (R63 5)   2  Encounter for gynecological examination without abnormal finding (V72 31) (Z01 419)   3  Encounter for surveillance of contraceptive pills (V25 41) (Z30 41)   4  Enlarged thyroid (240 9) (E04 9)   5  Fatigue (780 79) (R53 83)   6  Hirsutism (704 1) (L68 0)   7  Muscle ache (729 1) (M79 1)   8  Obesity (278 00) (E66 9)   9  PCOS (polycystic ovarian syndrome) (256 4) (E28 2)   10  Vitamin D deficiency (268 9) (E55 9)    Current Meds   1  Spironolactone 50 MG Oral Tablet; 1 Tab daily; Therapy: 23XNX6517 to (Evaluate:07Jan2017)  Requested for: 69NZB0445; Last   Rx:[id number] Ordered   2  Tri-Sprintec 0 18/0 215/0 25 MG-35 MCG Oral Tablet; TAKE 1 TABLET DAILY AS   DIRECTED; Last Rx:11Aug2016 Ordered   3  Vitamin D3 2000 UNIT Oral Capsule; 1 Tab daily; Therapy: 48SIN2270 to (Evaluate:56Jkt4205); Last Rx:02Jun2016 Ordered    Allergies    1   No Known Drug Allergies    Signatures Electronically signed by : Emily Maloney, ; Aug 19 2016  9:39AM EST                       (Author)

## 2018-01-19 ENCOUNTER — GENERIC CONVERSION - ENCOUNTER (OUTPATIENT)
Dept: OTHER | Facility: OTHER | Age: 23
End: 2018-01-19

## 2018-01-24 VITALS
BODY MASS INDEX: 32.76 KG/M2 | HEIGHT: 62 IN | DIASTOLIC BLOOD PRESSURE: 66 MMHG | SYSTOLIC BLOOD PRESSURE: 114 MMHG | TEMPERATURE: 98.6 F | HEART RATE: 89 BPM | WEIGHT: 178 LBS | RESPIRATION RATE: 16 BRPM | OXYGEN SATURATION: 97 %

## 2018-02-21 DIAGNOSIS — E28.2 POLYCYSTIC OVARIAN DISEASE: Primary | ICD-10-CM

## 2018-02-21 RX ORDER — SPIRONOLACTONE 50 MG/1
50 TABLET, FILM COATED ORAL 2 TIMES DAILY
Qty: 180 TABLET | Refills: 1 | Status: SHIPPED | OUTPATIENT
Start: 2018-02-21 | End: 2018-06-14 | Stop reason: SDUPTHER

## 2018-02-21 RX ORDER — SPIRONOLACTONE 50 MG/1
1 TABLET, FILM COATED ORAL 2 TIMES DAILY
COMMUNITY
Start: 2016-07-11 | End: 2018-02-21 | Stop reason: SDUPTHER

## 2018-03-12 ENCOUNTER — LAB (OUTPATIENT)
Dept: LAB | Facility: MEDICAL CENTER | Age: 23
End: 2018-03-12
Payer: COMMERCIAL

## 2018-03-12 DIAGNOSIS — E28.2 POLYCYSTIC OVARIAN SYNDROME: ICD-10-CM

## 2018-03-12 DIAGNOSIS — E04.9 NONTOXIC GOITER: ICD-10-CM

## 2018-03-12 DIAGNOSIS — E55.9 VITAMIN D DEFICIENCY: ICD-10-CM

## 2018-03-12 LAB
25(OH)D3 SERPL-MCNC: 19.6 NG/ML (ref 30–100)
ANION GAP SERPL CALCULATED.3IONS-SCNC: 9 MMOL/L (ref 4–13)
BUN SERPL-MCNC: 11 MG/DL (ref 5–25)
CALCIUM SERPL-MCNC: 9.3 MG/DL (ref 8.3–10.1)
CHLORIDE SERPL-SCNC: 100 MMOL/L (ref 100–108)
CO2 SERPL-SCNC: 25 MMOL/L (ref 21–32)
CREAT SERPL-MCNC: 0.67 MG/DL (ref 0.6–1.3)
GFR SERPL CREATININE-BSD FRML MDRD: 125 ML/MIN/1.73SQ M
GLUCOSE P FAST SERPL-MCNC: 81 MG/DL (ref 65–99)
POTASSIUM SERPL-SCNC: 4 MMOL/L (ref 3.5–5.3)
SODIUM SERPL-SCNC: 134 MMOL/L (ref 136–145)
T4 FREE SERPL-MCNC: 0.92 NG/DL (ref 0.76–1.46)
TSH SERPL DL<=0.05 MIU/L-ACNC: 2.76 UIU/ML (ref 0.36–3.74)

## 2018-03-12 PROCEDURE — 84439 ASSAY OF FREE THYROXINE: CPT

## 2018-03-12 PROCEDURE — 82306 VITAMIN D 25 HYDROXY: CPT

## 2018-03-12 PROCEDURE — 86800 THYROGLOBULIN ANTIBODY: CPT

## 2018-03-12 PROCEDURE — 36415 COLL VENOUS BLD VENIPUNCTURE: CPT

## 2018-03-12 PROCEDURE — 86376 MICROSOMAL ANTIBODY EACH: CPT

## 2018-03-12 PROCEDURE — 84443 ASSAY THYROID STIM HORMONE: CPT

## 2018-03-12 PROCEDURE — 80048 BASIC METABOLIC PNL TOTAL CA: CPT

## 2018-03-13 LAB
THYROGLOB AB SERPL-ACNC: <1 IU/ML (ref 0–0.9)
THYROPEROXIDASE AB SERPL-ACNC: 14 IU/ML (ref 0–34)

## 2018-03-15 ENCOUNTER — TELEPHONE (OUTPATIENT)
Dept: ENDOCRINOLOGY | Facility: CLINIC | Age: 23
End: 2018-03-15

## 2018-03-15 NOTE — PROGRESS NOTES
Please call the patient regarding her abnormal result   Labs ok but low vitamin d - start vitamin d3 5000 iu daily

## 2018-03-15 NOTE — TELEPHONE ENCOUNTER
----- Message from Gail Reynolds MD sent at 3/14/2018  9:04 PM EDT -----  Please call the patient regarding her abnormal result   Labs ok but low vitamin d - start vitamin d3 5000 iu daily

## 2018-04-05 ENCOUNTER — TELEPHONE (OUTPATIENT)
Dept: ENDOCRINOLOGY | Facility: CLINIC | Age: 23
End: 2018-04-05

## 2018-04-05 NOTE — TELEPHONE ENCOUNTER
Sunita Angry To Sent  4/4/2018  1:30 PM   Hello,     It's Sunita Angry, the one with PCOS who takes spironolactone with you  I know I come the end of the month to see you, but I was wondering what your opinions are on Berberine, NAC's and Ovasitol for pcos? 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

## 2018-06-14 ENCOUNTER — OFFICE VISIT (OUTPATIENT)
Dept: ENDOCRINOLOGY | Facility: CLINIC | Age: 23
End: 2018-06-14
Payer: COMMERCIAL

## 2018-06-14 VITALS
BODY MASS INDEX: 33.05 KG/M2 | HEIGHT: 62 IN | WEIGHT: 179.6 LBS | HEART RATE: 97 BPM | DIASTOLIC BLOOD PRESSURE: 98 MMHG | SYSTOLIC BLOOD PRESSURE: 152 MMHG

## 2018-06-14 DIAGNOSIS — E66.09 CLASS 1 OBESITY DUE TO EXCESS CALORIES WITHOUT SERIOUS COMORBIDITY WITH BODY MASS INDEX (BMI) OF 32.0 TO 32.9 IN ADULT: ICD-10-CM

## 2018-06-14 DIAGNOSIS — E28.2 POLYCYSTIC OVARIAN DISEASE: ICD-10-CM

## 2018-06-14 DIAGNOSIS — E55.9 VITAMIN D DEFICIENCY: ICD-10-CM

## 2018-06-14 DIAGNOSIS — E04.9 ENLARGED THYROID: ICD-10-CM

## 2018-06-14 DIAGNOSIS — E28.2 PCOS (POLYCYSTIC OVARIAN SYNDROME): Primary | ICD-10-CM

## 2018-06-14 PROCEDURE — 99214 OFFICE O/P EST MOD 30 MIN: CPT | Performed by: PHYSICIAN ASSISTANT

## 2018-06-14 RX ORDER — SPIRONOLACTONE 50 MG/1
50 TABLET, FILM COATED ORAL 2 TIMES DAILY
Qty: 180 TABLET | Refills: 1 | Status: SHIPPED | OUTPATIENT
Start: 2018-06-14 | End: 2018-06-14 | Stop reason: SDUPTHER

## 2018-06-14 RX ORDER — ACETAMINOPHEN 160 MG
4000 TABLET,DISINTEGRATING ORAL DAILY
Refills: 0
Start: 2018-06-14

## 2018-06-14 RX ORDER — SPIRONOLACTONE 50 MG/1
50 TABLET, FILM COATED ORAL 2 TIMES DAILY
Qty: 180 TABLET | Refills: 1 | Status: SHIPPED | OUTPATIENT
Start: 2018-06-14 | End: 2018-12-11

## 2018-06-14 NOTE — PROGRESS NOTES
Established Patient Progress Note       Chief Complaint   Patient presents with    Polycystic Ovary Syndrome    Obesity    Vitamin D Deficiency        History of Present Illness:     Shital Guerrero is a 21 y o  female with a history of PCOS  She had irregular periods in the past improved with OCP  She has history of Hirsutism and has noticed improvement on spironolactone  She tried metformin but stopped due to not noticing any change  She exercises regularly including running and follows healthy diet and unable to lose more than 5-10 pounds  She has tried weight watchers and meal replacements with little success  BP usually normal  Had caffeine today and tends to be very caffeine sensitive    Thyroid labs/ultrasound performed due to possible thyromegaly but were normal      For Vit D Deficiency, taking supplements at 2,000 units daily didn't increase dose after last lab test             Patient Active Problem List   Diagnosis    PCOS (polycystic ovarian syndrome)    Vitamin D deficiency    Obesity    Enlarged thyroid    Hirsutism      Past Medical History:   Diagnosis Date    Irregular menstrual cycle       Past Surgical History:   Procedure Laterality Date    NO PAST SURGERIES        Family History   Problem Relation Age of Onset    Arthritis Father     Depression Father     Alcohol abuse Other     Cancer Other      Social History   Substance Use Topics    Smoking status: Never Smoker    Smokeless tobacco: Never Used    Alcohol use Yes      Comment: occasional     No Known Allergies    Current Outpatient Prescriptions:     Cholecalciferol (VITAMIN D3) 2000 units capsule, Take 2 capsules (4,000 Units total) by mouth daily, Disp: , Rfl: 0    norgestimate-ethinyl estradiol (TRI-SPRINTEC) 0 18/0 215/0 25 MG-35 MCG per tablet, Take 1 tablet by mouth daily, Disp: , Rfl:     spironolactone (ALDACTONE) 50 mg tablet, Take 1 tablet (50 mg total) by mouth 2 (two) times a day for 180 days, Disp: 180 tablet, Rfl: 1    Review of Systems   Constitutional: Negative for activity change, appetite change, chills, diaphoresis, fatigue, fever and unexpected weight change  HENT: Negative for trouble swallowing and voice change  Eyes: Negative for visual disturbance  Respiratory: Negative for shortness of breath  Cardiovascular: Negative for chest pain and palpitations  Gastrointestinal: Negative for abdominal pain, constipation and diarrhea  Endocrine: Negative for cold intolerance, heat intolerance, polydipsia, polyphagia and polyuria  Genitourinary: Negative for frequency and menstrual problem  Musculoskeletal: Negative for arthralgias and myalgias  Skin: Negative for rash  Allergic/Immunologic: Negative for food allergies  Neurological: Negative for dizziness and tremors  Hematological: Negative for adenopathy  Psychiatric/Behavioral: Negative for sleep disturbance  All other systems reviewed and are negative  Physical Exam:  Body mass index is 32 85 kg/m²  /98   Pulse 97   Ht 5' 2" (1 575 m)   Wt 81 5 kg (179 lb 9 6 oz)   BMI 32 85 kg/m²    Wt Readings from Last 3 Encounters:   06/14/18 81 5 kg (179 lb 9 6 oz)   01/19/18 80 7 kg (178 lb)   10/30/17 81 8 kg (180 lb 4 oz)       Physical Exam   Constitutional: She is oriented to person, place, and time  She appears well-developed and well-nourished  No distress  HENT:   Head: Normocephalic and atraumatic  Eyes: Conjunctivae are normal  Pupils are equal, round, and reactive to light  Neck: Normal range of motion  Neck supple  No thyromegaly present  Cardiovascular: Normal rate, regular rhythm and normal heart sounds  Pulmonary/Chest: Effort normal and breath sounds normal  No respiratory distress  She has no wheezes  She has no rales  Abdominal: Soft  Bowel sounds are normal  She exhibits no distension  There is no tenderness  Musculoskeletal: Normal range of motion  She exhibits no edema     Neurological: She is alert and oriented to person, place, and time  Skin: Skin is warm and dry  Psychiatric: She has a normal mood and affect  Vitals reviewed  Labs:   Component      Latest Ref Rng & Units 11/7/2016 10/10/2017 3/12/2018   Sodium      136 - 145 mmol/L  136 134 (L)   Potassium      3 5 - 5 3 mmol/L  4 8 4 0   Chloride      100 - 108 mmol/L  106 100   CO2      21 - 32 mmol/L  19 (L) 25   Anion Gap      4 - 13 mmol/L  11 9   BUN      5 - 25 mg/dL  15 11   Creatinine      0 60 - 1 30 mg/dL  0 70 0 67   GLUCOSE FASTING      65 - 99 mg/dL  83 81   Calcium      8 3 - 10 1 mg/dL  9 0 9 3   AST      5 - 45 U/L  25    ALT      12 - 78 U/L  17    Alkaline Phosphatase      46 - 116 U/L  62    Total Protein      6 4 - 8 2 g/dL  8 0    Albumin      3 5 - 5 0 g/dL  3 4 (L)    Total Bilirubin      0 20 - 1 00 mg/dL  0 27    eGFR      ml/min/1 73sq m  123 125   Cholesterol      50 - 200 mg/dL  174    Triglycerides      <=150 mg/dL  183 (H)    HDL      40 - 60 mg/dL  79 (H)    LDL Calculated      0 - 100 mg/dL  58    TESTOSTERONE FREE      0 0 - 4 2 pg/mL 2 8 2 6    TESTOSTERONE (TOTAL)      8 - 48 ng/dL 71 (H) 58 (H)    TSH 3RD GENERATON      0 358 - 3 740 uIU/mL  3 280 2 760   Free T4      0 76 - 1 46 ng/dL  1 11 0 92   INSULIN      3 0 - 25 0 mU/L  15 2    Vit D, 25-Hydroxy      30 0 - 100 0 ng/mL   19 6 (L)   THYROID MICROSOMAL ANTIBODY      0 - 34 IU/mL   14   THYROGLOBULIN AB      0 0 - 0 9 IU/mL   <1 0           Impression & Plan:    Problem List Items Addressed This Visit     PCOS (polycystic ovarian syndrome) - Primary     Continue OCP and spironolactone  Periods are regular and hirsutism has improved on the spironolactone  She discontinued metformin due to lack of benefit  Will repeat basic metabolic panel and testosterone levels in falls  Relevant Orders    Testosterone, free, total    Vitamin D deficiency     Increase supplements to 4,000 units daily            Relevant Orders    Vitamin D 25 hydroxy Obesity     She has not been successful with weight loss despite diet and exercise  She has tried weight watchers/meal replacements in past with little change in weight  She would pharmacologic therapy to assist with weight loss, though none seem to be covered under her plan based on epic formulary  She will check with her insurance formulary to see if any medications are covered and if interested in trying out medication she will let us know  She tends jittery with even small amounts of caffeine so would avoid qsymia  She will check on saxenda, belviq, and contrave  Enlarged thyroid     Ultrasound was normal             Other Visit Diagnoses     Polycystic ovarian disease        Relevant Medications    spironolactone (ALDACTONE) 50 mg tablet    Cholecalciferol (VITAMIN D3) 2000 units capsule    Other Relevant Orders    Basic metabolic panel          Orders Placed This Encounter   Procedures    Basic metabolic panel     This is a patient instruction: Patient fasting for 8 hours or longer recommended  Standing Status:   Future     Standing Expiration Date:   6/14/2019    Testosterone, free, total     This is a patient instruction: Fasting preferred  Collections for men not undergoing treatment must be completed between 7am-9am ONLY  Collection time restrictions are not applicable to women or men already undergoing treatment  Standing Status:   Future     Standing Expiration Date:   6/14/2019    Vitamin D 25 hydroxy     Standing Status:   Future     Standing Expiration Date:   6/14/2019       There are no Patient Instructions on file for this visit  Discussed with the patient and all questioned fully answered  She will call me if any problems arise  Follow-up appointment in 6 months       Counseled patient on diagnostic results, prognosis, risk and benefit of treatment options, instruction for management, importance of treatment compliance, Risk  factor reduction and impressions      Katharine Castro PA-C

## 2018-06-14 NOTE — ASSESSMENT & PLAN NOTE
Continue OCP and spironolactone  Periods are regular and hirsutism has improved on the spironolactone  She discontinued metformin due to lack of benefit  Will repeat basic metabolic panel and testosterone levels in falls

## 2018-06-14 NOTE — LETTER
June 14, 2018     Gladis Eisenberg DO  [address]    Patient: Rupinder Palacios   YOB: 1995   Date of Visit: 6/14/2018       Dear Dr Romie Williamson:    Thank you for referring Rupinder Palacios to me for evaluation  Below are my notes for this consultation  If you have questions, please do not hesitate to call me  I look forward to following your patient along with you  Sincerely,        Michelle Weiss PA-C        CC: No Recipients  Michelle Weiss PA-C  6/14/2018  3:41 PM  Sign at close encounter    Established Patient Progress Note       Chief Complaint   Patient presents with    Polycystic Ovary Syndrome    Obesity    Vitamin D Deficiency        History of Present Illness:     Rupinder Palacios is a 21 y o  female with a history of PCOS  She had irregular periods in the past improved with OCP  She has history of Hirsutism and has noticed improvement on spironolactone  She tried metformin but stopped due to not noticing any change  She exercises regularly including running and follows healthy diet and unable to lose more than 5-10 pounds  She has tried weight watchers and meal replacements with little success  BP usually normal  Had caffeine today and tends to be very caffeine sensitive    Thyroid labs/ultrasound performed due to possible thyromegaly but were normal      For Vit D Deficiency, taking supplements at 2,000 units daily didn't increase dose after last lab test             Patient Active Problem List   Diagnosis    PCOS (polycystic ovarian syndrome)    Vitamin D deficiency    Obesity    Enlarged thyroid    Hirsutism      Past Medical History:   Diagnosis Date    Irregular menstrual cycle       Past Surgical History:   Procedure Laterality Date    NO PAST SURGERIES        Family History   Problem Relation Age of Onset    Arthritis Father     Depression Father     Alcohol abuse Other     Cancer Other      Social History   Substance Use Topics    Smoking status: Never Smoker    Smokeless tobacco: Never Used    Alcohol use Yes      Comment: occasional     No Known Allergies    Current Outpatient Prescriptions:     Cholecalciferol (VITAMIN D3) 2000 units capsule, Take 2 capsules (4,000 Units total) by mouth daily, Disp: , Rfl: 0    norgestimate-ethinyl estradiol (TRI-SPRINTEC) 0 18/0 215/0 25 MG-35 MCG per tablet, Take 1 tablet by mouth daily, Disp: , Rfl:     spironolactone (ALDACTONE) 50 mg tablet, Take 1 tablet (50 mg total) by mouth 2 (two) times a day for 180 days, Disp: 180 tablet, Rfl: 1    Review of Systems   Constitutional: Negative for activity change, appetite change, chills, diaphoresis, fatigue, fever and unexpected weight change  HENT: Negative for trouble swallowing and voice change  Eyes: Negative for visual disturbance  Respiratory: Negative for shortness of breath  Cardiovascular: Negative for chest pain and palpitations  Gastrointestinal: Negative for abdominal pain, constipation and diarrhea  Endocrine: Negative for cold intolerance, heat intolerance, polydipsia, polyphagia and polyuria  Genitourinary: Negative for frequency and menstrual problem  Musculoskeletal: Negative for arthralgias and myalgias  Skin: Negative for rash  Allergic/Immunologic: Negative for food allergies  Neurological: Negative for dizziness and tremors  Hematological: Negative for adenopathy  Psychiatric/Behavioral: Negative for sleep disturbance  All other systems reviewed and are negative  Physical Exam:  Body mass index is 32 85 kg/m²  /98   Pulse 97   Ht 5' 2" (1 575 m)   Wt 81 5 kg (179 lb 9 6 oz)   BMI 32 85 kg/m²     Wt Readings from Last 3 Encounters:   06/14/18 81 5 kg (179 lb 9 6 oz)   01/19/18 80 7 kg (178 lb)   10/30/17 81 8 kg (180 lb 4 oz)       Physical Exam   Constitutional: She is oriented to person, place, and time  She appears well-developed and well-nourished  No distress     HENT: Head: Normocephalic and atraumatic  Eyes: Conjunctivae are normal  Pupils are equal, round, and reactive to light  Neck: Normal range of motion  Neck supple  No thyromegaly present  Cardiovascular: Normal rate, regular rhythm and normal heart sounds  Pulmonary/Chest: Effort normal and breath sounds normal  No respiratory distress  She has no wheezes  She has no rales  Abdominal: Soft  Bowel sounds are normal  She exhibits no distension  There is no tenderness  Musculoskeletal: Normal range of motion  She exhibits no edema  Neurological: She is alert and oriented to person, place, and time  Skin: Skin is warm and dry  Psychiatric: She has a normal mood and affect  Vitals reviewed        Labs:   Component      Latest Ref Rng & Units 11/7/2016 10/10/2017 3/12/2018   Sodium      136 - 145 mmol/L  136 134 (L)   Potassium      3 5 - 5 3 mmol/L  4 8 4 0   Chloride      100 - 108 mmol/L  106 100   CO2      21 - 32 mmol/L  19 (L) 25   Anion Gap      4 - 13 mmol/L  11 9   BUN      5 - 25 mg/dL  15 11   Creatinine      0 60 - 1 30 mg/dL  0 70 0 67   GLUCOSE FASTING      65 - 99 mg/dL  83 81   Calcium      8 3 - 10 1 mg/dL  9 0 9 3   AST      5 - 45 U/L  25    ALT      12 - 78 U/L  17    Alkaline Phosphatase      46 - 116 U/L  62    Total Protein      6 4 - 8 2 g/dL  8 0    Albumin      3 5 - 5 0 g/dL  3 4 (L)    Total Bilirubin      0 20 - 1 00 mg/dL  0 27    eGFR      ml/min/1 73sq m  123 125   Cholesterol      50 - 200 mg/dL  174    Triglycerides      <=150 mg/dL  183 (H)    HDL      40 - 60 mg/dL  79 (H)    LDL Calculated      0 - 100 mg/dL  58    TESTOSTERONE FREE      0 0 - 4 2 pg/mL 2 8 2 6    TESTOSTERONE (TOTAL)      8 - 48 ng/dL 71 (H) 58 (H)    TSH 3RD GENERATON      0 358 - 3 740 uIU/mL  3 280 2 760   Free T4      0 76 - 1 46 ng/dL  1 11 0 92   INSULIN      3 0 - 25 0 mU/L  15 2    Vit D, 25-Hydroxy      30 0 - 100 0 ng/mL   19 6 (L)   THYROID MICROSOMAL ANTIBODY      0 - 34 IU/mL   14 THYROGLOBULIN AB      0 0 - 0 9 IU/mL   <1 0           Impression & Plan:    Problem List Items Addressed This Visit     PCOS (polycystic ovarian syndrome) - Primary     Continue OCP and spironolactone  Periods are regular and hirsutism has improved on the spironolactone  She discontinued metformin due to lack of benefit  Will repeat basic metabolic panel and testosterone levels in falls  Relevant Orders    Testosterone, free, total    Vitamin D deficiency     Increase supplements to 4,000 units daily  Relevant Orders    Vitamin D 25 hydroxy    Obesity     She has not been successful with weight loss despite diet and exercise  She has tried weight watchers/meal replacements in past with little change in weight  She would pharmacologic therapy to assist with weight loss, though none seem to be covered under her plan based on epic formulary  She will check with her insurance formulary to see if any medications are covered and if interested in trying out medication she will let us know  She tends jittery with even small amounts of caffeine so would avoid qsymia  She will check on saxenda, belviq, and contrave  Enlarged thyroid     Ultrasound was normal             Other Visit Diagnoses     Polycystic ovarian disease        Relevant Medications    spironolactone (ALDACTONE) 50 mg tablet    Cholecalciferol (VITAMIN D3) 2000 units capsule    Other Relevant Orders    Basic metabolic panel          Orders Placed This Encounter   Procedures    Basic metabolic panel     This is a patient instruction: Patient fasting for 8 hours or longer recommended  Standing Status:   Future     Standing Expiration Date:   6/14/2019    Testosterone, free, total     This is a patient instruction: Fasting preferred  Collections for men not undergoing treatment must be completed between 7am-9am ONLY  Collection time restrictions are not applicable to women or men already undergoing treatment  Standing Status:   Future     Standing Expiration Date:   6/14/2019    Vitamin D 25 hydroxy     Standing Status:   Future     Standing Expiration Date:   6/14/2019       There are no Patient Instructions on file for this visit  Discussed with the patient and all questioned fully answered  She will call me if any problems arise  Follow-up appointment in 6 months       Counseled patient on diagnostic results, prognosis, risk and benefit of treatment options, instruction for management, importance of treatment compliance, Risk  factor reduction and impressions      Wilfrid Estrada PA-C

## 2018-06-14 NOTE — ASSESSMENT & PLAN NOTE
She has not been successful with weight loss despite diet and exercise  She has tried weight watchers/meal replacements in past with little change in weight  She would pharmacologic therapy to assist with weight loss, though none seem to be covered under her plan based on epic formulary  She will check with her insurance formulary to see if any medications are covered and if interested in trying out medication she will let us know  She tends jittery with even small amounts of caffeine so would avoid qsymia  She will check on saxenda, belviq, and contrave

## 2018-06-21 DIAGNOSIS — Z30.41 SURVEILLANCE OF PREVIOUSLY PRESCRIBED CONTRACEPTIVE PILL: Primary | ICD-10-CM

## 2018-06-21 RX ORDER — NORGESTIMATE AND ETHINYL ESTRADIOL 7DAYSX3 28
1 KIT ORAL DAILY
Qty: 28 TABLET | Refills: 1 | Status: SHIPPED | OUTPATIENT
Start: 2018-06-21 | End: 2018-07-25 | Stop reason: SDUPTHER

## 2018-07-17 ENCOUNTER — TELEPHONE (OUTPATIENT)
Dept: OBGYN CLINIC | Facility: CLINIC | Age: 23
End: 2018-07-17

## 2018-07-25 ENCOUNTER — ANNUAL EXAM (OUTPATIENT)
Dept: OBGYN CLINIC | Facility: MEDICAL CENTER | Age: 23
End: 2018-07-25
Payer: COMMERCIAL

## 2018-07-25 VITALS
BODY MASS INDEX: 33.53 KG/M2 | DIASTOLIC BLOOD PRESSURE: 74 MMHG | SYSTOLIC BLOOD PRESSURE: 120 MMHG | HEIGHT: 62 IN | WEIGHT: 182.2 LBS

## 2018-07-25 DIAGNOSIS — Z30.41 SURVEILLANCE OF CONTRACEPTIVE PILL: ICD-10-CM

## 2018-07-25 DIAGNOSIS — Z12.4 CERVICAL CANCER SCREENING: ICD-10-CM

## 2018-07-25 DIAGNOSIS — Z30.41 SURVEILLANCE OF PREVIOUSLY PRESCRIBED CONTRACEPTIVE PILL: ICD-10-CM

## 2018-07-25 DIAGNOSIS — Z01.419 ENCOUNTER FOR GYNECOLOGICAL EXAMINATION (GENERAL) (ROUTINE) WITHOUT ABNORMAL FINDINGS: Primary | ICD-10-CM

## 2018-07-25 DIAGNOSIS — E28.2 PCOS (POLYCYSTIC OVARIAN SYNDROME): ICD-10-CM

## 2018-07-25 PROCEDURE — G0145 SCR C/V CYTO,THINLAYER,RESCR: HCPCS | Performed by: NURSE PRACTITIONER

## 2018-07-25 PROCEDURE — S0612 ANNUAL GYNECOLOGICAL EXAMINA: HCPCS | Performed by: NURSE PRACTITIONER

## 2018-07-25 RX ORDER — NORGESTIMATE AND ETHINYL ESTRADIOL 7DAYSX3 28
1 KIT ORAL DAILY
Qty: 90 TABLET | Refills: 3 | Status: SHIPPED | OUTPATIENT
Start: 2018-07-25 | End: 2019-06-17 | Stop reason: SDUPTHER

## 2018-07-25 NOTE — ASSESSMENT & PLAN NOTE
Continue OCP and spironolactone  She has d/c'd metformin, as she did not feel this was clinically effective  Considering electrolysis for facial hair  Followed by Endo as well

## 2018-07-25 NOTE — ASSESSMENT & PLAN NOTE
Normal findings on routine annual gyn exam  Recommended monthly SBE, annual CBE  Reviewed ASCCP guidelines and pap with reflex was collected today (this is 24 mo f/u from 2016: ASCUS -> 2017: normal cyto)  The patient reports she has completed Gardasil series  STI testing was offered and declined at this time; the patient is aware that condoms are recommended for all sexual contact for prevention of STI  The patient likes current contraceptive measure and desires to continue  Reviewed diet/activity recommendations and reasons to call  F/u in one year for routine annual gyn exam or sooner PRN

## 2018-07-25 NOTE — ASSESSMENT & PLAN NOTE
The patient likes current OCP  She denies ACHES and desires to continue  She is aware condoms are advised with all sexual contact for prevention of STI  Refill provided  Reviewed reasons to call

## 2018-07-25 NOTE — PROGRESS NOTES
Assessment/Plan:    Encounter for gynecological examination (general) (routine) without abnormal findings  Normal findings on routine annual gyn exam  Recommended monthly SBE, annual CBE  Reviewed ASCCP guidelines and pap with reflex was collected today (this is 24 mo f/u from 2016: ASCUS -> 2017: normal cyto)  The patient reports she has completed Gardasil series  STI testing was offered and declined at this time; the patient is aware that condoms are recommended for all sexual contact for prevention of STI  The patient likes current contraceptive measure and desires to continue  Reviewed diet/activity recommendations and reasons to call  F/u in one year for routine annual gyn exam or sooner PRN  PCOS (polycystic ovarian syndrome)  Continue OCP and spironolactone  She has d/c'd metformin, as she did not feel this was clinically effective  Considering electrolysis for facial hair  Followed by Endo as well  Surveillance of contraceptive pill  The patient likes current OCP  She denies ACHES and desires to continue  She is aware condoms are advised with all sexual contact for prevention of STI  Refill provided  Reviewed reasons to call  Diagnoses and all orders for this visit:    Encounter for gynecological examination (general) (routine) without abnormal findings    Cervical cancer screening  -     Liquid-based pap, screening    PCOS (polycystic ovarian syndrome)    Surveillance of previously prescribed contraceptive pill  -     norgestimate-ethinyl estradiol (TRI-SPRINTEC) 0 18/0 215/0 25 MG-35 MCG per tablet; Take 1 tablet by mouth daily    Surveillance of contraceptive pill          Subjective:      Patient ID: Tino Ma is a 21 y o  female  This patient presents for routine annual gyn exam    She denies acute gyn complaints  Likes current OCP and desires to continue; denies ACHES  She denies pelvic pain, abn discharge, breast concerns, bowel/bladder dysfunction, depression/anx  Monog  Denies STI concerns  The following portions of the patient's history were reviewed and updated as appropriate: allergies, current medications, past family history, past medical history, past social history, past surgical history and problem list     Review of Systems   Constitutional: Negative  HENT: Negative  Eyes: Negative  Respiratory: Negative  Cardiovascular: Negative  Gastrointestinal: Negative  Endocrine: Negative  Genitourinary: Negative  Musculoskeletal: Negative  Skin: Negative  Allergic/Immunologic: Negative  Neurological: Negative  Hematological: Negative  Psychiatric/Behavioral: Negative  Objective:      /74 (BP Location: Left arm, Cuff Size: Extra-Large)   Ht 5' 2" (1 575 m)   Wt 82 6 kg (182 lb 3 2 oz)   LMP 07/18/2018   BMI 33 32 kg/m²          Physical Exam   Constitutional: She is oriented to person, place, and time  She appears well-developed and well-nourished  HENT:   Head: Normocephalic and atraumatic  Eyes: EOM are normal  Pupils are equal, round, and reactive to light  Neck: Normal range of motion  Neck supple  No thyromegaly present  Cardiovascular: Normal rate, regular rhythm and normal heart sounds  Pulmonary/Chest: Effort normal and breath sounds normal  No respiratory distress  She has no wheezes  She has no rales  She exhibits no mass, no tenderness and no deformity  Right breast exhibits no inverted nipple, no mass, no nipple discharge, no skin change and no tenderness  Left breast exhibits no inverted nipple, no mass, no nipple discharge, no skin change and no tenderness  Breasts are symmetrical    Abdominal: Soft  She exhibits no distension and no mass  There is no splenomegaly or hepatomegaly  There is no tenderness  There is no rebound and no guarding  Genitourinary: Rectum normal, vagina normal and uterus normal  No breast swelling, tenderness or discharge  No labial fusion   There is no rash, tenderness, lesion or injury on the right labia  There is no rash, tenderness, lesion or injury on the left labia  Cervix exhibits no motion tenderness, no discharge and no friability  Right adnexum displays no mass, no tenderness and no fullness  Left adnexum displays no mass, no tenderness and no fullness  No erythema, tenderness or bleeding in the vagina  No foreign body in the vagina  No vaginal discharge found  Musculoskeletal: Normal range of motion  Lymphadenopathy:     She has no cervical adenopathy  She has no axillary adenopathy  Neurological: She is alert and oriented to person, place, and time  No cranial nerve deficit  Skin: Skin is warm and dry  No rash noted  No cyanosis  Nails show no clubbing  Psychiatric: She has a normal mood and affect   Her speech is normal and behavior is normal  Judgment and thought content normal  Cognition and memory are normal

## 2018-07-27 ENCOUNTER — TELEPHONE (OUTPATIENT)
Dept: OBGYN CLINIC | Facility: CLINIC | Age: 23
End: 2018-07-27

## 2018-07-27 LAB
LAB AP GYN PRIMARY INTERPRETATION: NORMAL
LAB AP LMP: NORMAL
Lab: NORMAL

## 2018-07-27 NOTE — TELEPHONE ENCOUNTER
----- Message from Cher Simmons sent at 7/27/2018 12:18 PM EDT -----  Normal pap   Please notify patient by cell per her request

## 2018-08-17 NOTE — TELEPHONE ENCOUNTER
I spoke with patient, per our records 90 day supply with 3 refills was sent to [facility] on 7/25   Patient is going to check with pharmacy

## 2018-08-17 NOTE — TELEPHONE ENCOUNTER
Per prev note,   pts BC was NOT sent to pharm,   pls send in for pt & advise her this was taken care of,   thanks

## 2019-05-01 ENCOUNTER — TELEPHONE (OUTPATIENT)
Dept: FAMILY MEDICINE CLINIC | Facility: CLINIC | Age: 24
End: 2019-05-01

## 2019-06-17 ENCOUNTER — TELEPHONE (OUTPATIENT)
Dept: OBGYN CLINIC | Facility: CLINIC | Age: 24
End: 2019-06-17

## 2019-06-17 DIAGNOSIS — Z30.41 SURVEILLANCE OF PREVIOUSLY PRESCRIBED CONTRACEPTIVE PILL: ICD-10-CM

## 2019-06-17 RX ORDER — NORGESTIMATE AND ETHINYL ESTRADIOL 7DAYSX3 28
1 KIT ORAL DAILY
Qty: 90 TABLET | Refills: 0 | Status: SHIPPED | OUTPATIENT
Start: 2019-06-17

## 2020-01-08 ENCOUNTER — TELEPHONE (OUTPATIENT)
Dept: FAMILY MEDICINE CLINIC | Facility: CLINIC | Age: 25
End: 2020-01-08